# Patient Record
Sex: MALE | Race: WHITE | NOT HISPANIC OR LATINO | Employment: FULL TIME | URBAN - METROPOLITAN AREA
[De-identification: names, ages, dates, MRNs, and addresses within clinical notes are randomized per-mention and may not be internally consistent; named-entity substitution may affect disease eponyms.]

---

## 2023-10-16 ENCOUNTER — TELEPHONE (OUTPATIENT)
Dept: PRIMARY CARE | Facility: CLINIC | Age: 43
End: 2023-10-16
Payer: COMMERCIAL

## 2023-10-16 NOTE — TELEPHONE ENCOUNTER
Patient's wife called stating patient, spouse and daughter have covid.  Patient has a terrible cough. Patient treated with paxlovid.  Patient's daughter was prescribed tessalon perls by her physician which is helping her and told patient to ask if he also could have rx for tessalon perls.  [facility] [place].  Wife lAejandra phone [phone].

## 2024-01-05 ENCOUNTER — TELEPHONE (OUTPATIENT)
Dept: PRIMARY CARE | Facility: CLINIC | Age: 44
End: 2024-01-05
Payer: COMMERCIAL

## 2024-01-26 ENCOUNTER — APPOINTMENT (OUTPATIENT)
Dept: PRIMARY CARE | Facility: CLINIC | Age: 44
End: 2024-01-26
Payer: COMMERCIAL

## 2024-02-09 ENCOUNTER — APPOINTMENT (OUTPATIENT)
Dept: PRIMARY CARE | Facility: CLINIC | Age: 44
End: 2024-02-09
Payer: COMMERCIAL

## 2024-02-21 ENCOUNTER — OFFICE VISIT (OUTPATIENT)
Dept: PRIMARY CARE | Facility: CLINIC | Age: 44
End: 2024-02-21
Payer: COMMERCIAL

## 2024-02-21 ENCOUNTER — LAB (OUTPATIENT)
Dept: LAB | Facility: LAB | Age: 44
End: 2024-02-21
Payer: COMMERCIAL

## 2024-02-21 VITALS
OXYGEN SATURATION: 97 % | DIASTOLIC BLOOD PRESSURE: 113 MMHG | SYSTOLIC BLOOD PRESSURE: 160 MMHG | HEIGHT: 67 IN | BODY MASS INDEX: 43.24 KG/M2 | TEMPERATURE: 97.1 F | WEIGHT: 275.5 LBS | HEART RATE: 88 BPM

## 2024-02-21 DIAGNOSIS — R63.5 WEIGHT GAIN: ICD-10-CM

## 2024-02-21 DIAGNOSIS — Z00.00 ANNUAL PHYSICAL EXAM: ICD-10-CM

## 2024-02-21 DIAGNOSIS — G47.33 OBSTRUCTIVE SLEEP APNEA SYNDROME: ICD-10-CM

## 2024-02-21 DIAGNOSIS — E55.9 VITAMIN D DEFICIENCY: ICD-10-CM

## 2024-02-21 DIAGNOSIS — Z23 ENCOUNTER FOR IMMUNIZATION: Primary | ICD-10-CM

## 2024-02-21 DIAGNOSIS — Z86.69 HISTORY OF GLAUCOMA: ICD-10-CM

## 2024-02-21 DIAGNOSIS — K21.9 GASTROESOPHAGEAL REFLUX DISEASE, UNSPECIFIED WHETHER ESOPHAGITIS PRESENT: ICD-10-CM

## 2024-02-21 DIAGNOSIS — I10 HYPERTENSION, UNSPECIFIED TYPE: ICD-10-CM

## 2024-02-21 DIAGNOSIS — M62.838 MUSCLE SPASM: ICD-10-CM

## 2024-02-21 DIAGNOSIS — L82.1 SEBORRHEIC KERATOSES: ICD-10-CM

## 2024-02-21 DIAGNOSIS — L98.9 SKIN LESION: ICD-10-CM

## 2024-02-21 PROBLEM — R51.9 HEADACHE: Status: ACTIVE | Noted: 2024-02-21

## 2024-02-21 PROBLEM — M54.2 NECK PAIN: Status: ACTIVE | Noted: 2024-02-21

## 2024-02-21 PROBLEM — M51.9 LUMBAR DISC DISEASE: Status: ACTIVE | Noted: 2024-02-21

## 2024-02-21 PROBLEM — R91.1 LUNG NODULE: Status: RESOLVED | Noted: 2024-02-21 | Resolved: 2024-02-21

## 2024-02-21 LAB
25(OH)D3 SERPL-MCNC: 38 NG/ML (ref 30–100)
ALBUMIN SERPL BCP-MCNC: 4.7 G/DL (ref 3.4–5)
ALP SERPL-CCNC: 72 U/L (ref 33–120)
ALT SERPL W P-5'-P-CCNC: 72 U/L (ref 10–52)
ANION GAP SERPL CALC-SCNC: 12 MMOL/L (ref 10–20)
AST SERPL W P-5'-P-CCNC: 38 U/L (ref 9–39)
BILIRUB SERPL-MCNC: 0.7 MG/DL (ref 0–1.2)
BUN SERPL-MCNC: 12 MG/DL (ref 6–23)
CALCIUM SERPL-MCNC: 9.9 MG/DL (ref 8.6–10.6)
CHLORIDE SERPL-SCNC: 101 MMOL/L (ref 98–107)
CHOLEST SERPL-MCNC: 230 MG/DL (ref 0–199)
CHOLESTEROL/HDL RATIO: 5.9
CO2 SERPL-SCNC: 31 MMOL/L (ref 21–32)
CREAT SERPL-MCNC: 0.89 MG/DL (ref 0.5–1.3)
EGFRCR SERPLBLD CKD-EPI 2021: >90 ML/MIN/1.73M*2
GLUCOSE SERPL-MCNC: 90 MG/DL (ref 74–99)
HDLC SERPL-MCNC: 39.3 MG/DL
LDLC SERPL CALC-MCNC: 128 MG/DL
NON HDL CHOLESTEROL: 191 MG/DL (ref 0–149)
POTASSIUM SERPL-SCNC: 3.8 MMOL/L (ref 3.5–5.3)
PROT SERPL-MCNC: 7.7 G/DL (ref 6.4–8.2)
SODIUM SERPL-SCNC: 140 MMOL/L (ref 136–145)
TRIGL SERPL-MCNC: 316 MG/DL (ref 0–149)
TSH SERPL-ACNC: 1.23 MIU/L (ref 0.44–3.98)
VLDL: 63 MG/DL (ref 0–40)

## 2024-02-21 PROCEDURE — 3080F DIAST BP >= 90 MM HG: CPT | Performed by: NURSE PRACTITIONER

## 2024-02-21 PROCEDURE — 80061 LIPID PANEL: CPT

## 2024-02-21 PROCEDURE — 82306 VITAMIN D 25 HYDROXY: CPT

## 2024-02-21 PROCEDURE — 1036F TOBACCO NON-USER: CPT | Performed by: NURSE PRACTITIONER

## 2024-02-21 PROCEDURE — 90471 IMMUNIZATION ADMIN: CPT | Performed by: NURSE PRACTITIONER

## 2024-02-21 PROCEDURE — 80053 COMPREHEN METABOLIC PANEL: CPT

## 2024-02-21 PROCEDURE — 3077F SYST BP >= 140 MM HG: CPT | Performed by: NURSE PRACTITIONER

## 2024-02-21 PROCEDURE — 90715 TDAP VACCINE 7 YRS/> IM: CPT | Performed by: NURSE PRACTITIONER

## 2024-02-21 PROCEDURE — 36415 COLL VENOUS BLD VENIPUNCTURE: CPT

## 2024-02-21 PROCEDURE — 99396 PREV VISIT EST AGE 40-64: CPT | Performed by: NURSE PRACTITIONER

## 2024-02-21 PROCEDURE — 84443 ASSAY THYROID STIM HORMONE: CPT

## 2024-02-21 RX ORDER — CYCLOBENZAPRINE HCL 10 MG
10 TABLET ORAL 2 TIMES DAILY PRN
Qty: 30 TABLET | Refills: 2 | Status: SHIPPED | OUTPATIENT
Start: 2024-02-21 | End: 2024-04-21

## 2024-02-21 RX ORDER — OMEPRAZOLE 40 MG/1
40 CAPSULE, DELAYED RELEASE ORAL DAILY
COMMUNITY
End: 2024-02-21 | Stop reason: SDUPTHER

## 2024-02-21 RX ORDER — DORZOLAMIDE HYDROCHLORIDE AND TIMOLOL MALEATE 20; 5 MG/ML; MG/ML
SOLUTION/ DROPS OPHTHALMIC
COMMUNITY
Start: 2024-02-05

## 2024-02-21 RX ORDER — OMEPRAZOLE 40 MG/1
40 CAPSULE, DELAYED RELEASE ORAL DAILY
Qty: 90 CAPSULE | Refills: 3 | Status: SHIPPED | OUTPATIENT
Start: 2024-02-21 | End: 2024-02-21 | Stop reason: SDUPTHER

## 2024-02-21 RX ORDER — LATANOPROST 50 UG/ML
SOLUTION/ DROPS OPHTHALMIC
COMMUNITY

## 2024-02-21 RX ORDER — TELMISARTAN 80 MG/1
80 TABLET ORAL DAILY
COMMUNITY

## 2024-02-21 RX ORDER — OMEPRAZOLE 40 MG/1
40 CAPSULE, DELAYED RELEASE ORAL DAILY
Qty: 90 CAPSULE | Refills: 3 | Status: SHIPPED | OUTPATIENT
Start: 2024-02-21 | End: 2025-02-20

## 2024-02-21 ASSESSMENT — ENCOUNTER SYMPTOMS
APPETITE CHANGE: 0
NERVOUS/ANXIOUS: 0
EYE PAIN: 0
NUMBNESS: 0
DIARRHEA: 0
ARTHRALGIAS: 0
VOMITING: 0
WHEEZING: 0
PALPITATIONS: 0
FREQUENCY: 0
HEMATURIA: 0
COUGH: 0
ABDOMINAL PAIN: 0
ABDOMINAL DISTENTION: 0
SHORTNESS OF BREATH: 0
ACTIVITY CHANGE: 0
EYE ITCHING: 0
DYSURIA: 0
WOUND: 0
CONSTIPATION: 0

## 2024-02-21 ASSESSMENT — PATIENT HEALTH QUESTIONNAIRE - PHQ9
2. FEELING DOWN, DEPRESSED OR HOPELESS: NOT AT ALL
1. LITTLE INTEREST OR PLEASURE IN DOING THINGS: NOT AT ALL
SUM OF ALL RESPONSES TO PHQ9 QUESTIONS 1 AND 2: 0

## 2024-02-21 NOTE — PROGRESS NOTES
Ole Maxwell male   1980 43 y.o.   [id number]    Chief Complaint  Establish Care.    History Of Present Illness  Ole Maxwell is a 43 y.o. male presents today to establish care.     Chronic conditions reviewed:     Glaucoma   Seun in [place] every 6 mo.  Recent chelazion.  Has follow up this week.   As infant treated w O2 which caused damage to eyes   Cataracts at age 15.   Developed glaucoma around age 12-14.       GERD   On ppi x years.     HTN   Managed on telmisartan.  Compliant with medication.   States had lost a significant amount of weight.     Lung nodule   Had bx in past. No malignancy noted per patient and does not require follow up     Sciatic pain   States has known disc protrusion.    Manages pain w marijuana (edible or vape)    In past also managed opioids prn.       Sleep apnea   Had sleep study done. Sleep study completed 12/12/2022-severe obstructive sleep apnea noted SpO2 zander was 68%Would like to start CPAP.      States has an area on back that he would like to be evaluated by dermatologist.     Headaches/neck pain   Follows w chiro every 2-3 weeks.     Palpitations   On occasions -- noted since COVID in octobers.      Review of Systems  Review of Systems   Constitutional:  Negative for activity change and appetite change.   HENT:  Negative for congestion.    Eyes:  Negative for pain and itching.   Respiratory:  Negative for cough, shortness of breath and wheezing.    Cardiovascular:  Negative for chest pain, palpitations and leg swelling.   Gastrointestinal:  Negative for abdominal distention, abdominal pain, constipation, diarrhea (loose stools at times) and vomiting.   Genitourinary:  Negative for dysuria, frequency, hematuria and urgency.   Musculoskeletal:  Negative for arthralgias and gait problem.   Skin:  Negative for rash and wound.   Neurological:  Negative for numbness.   Psychiatric/Behavioral:  The patient is not nervous/anxious.        Diet: trying to stay healthy  "and loose weight.   Exercise: yes, daily.   Dental: Twice per year  Ophtho: Dr Kohli   States had colonoscopy in past which was benign- no follow up was needed.   Done due to GI complaints at that time   Immunizations:   Flu- declines  Tdap- would like today   Covid- had some vaccines, not latest booster       Past Medical History  He has a past medical history of Lung nodule (02/21/2024) and Personal history of other mental and behavioral disorders.    Surgical History  He has a past surgical history that includes Other surgical history (12/11/2018); Other surgical history (12/11/2018); Other surgical history (12/11/2018); and CT guided percutaneous biopsy lung (10/18/2018).    Family History  Family History   Problem Relation Name Age of Onset    Diabetes Father      Thyroid disease Father      Heart disease Father      Breast cancer Father          Social History  He reports that he quit smoking about 24 years ago. His smoking use included cigarettes. He has never used smokeless tobacco. He reports that he does not currently use alcohol. He reports current drug use. Drug: Marijuana.    DEPRESSION SCREEN  Over the past 2 weeks, how often have you been bothered by any of the following problems?  Little interest or pleasure in doing things: Not at all  Feeling down, depressed, or hopeless: Not at all    Allergies  Patient has no known allergies.    Medications  Current Outpatient Medications   Medication Instructions    cyclobenzaprine (FLEXERIL) 10 mg, oral, 2 times daily PRN    dorzolamide-timoloL (Cosopt) 22.3-6.8 mg/mL ophthalmic solution INSTILL 1 DROP INTO LEFT EYE TWICE A DAY    hydroCHLOROthiazide (HYDRODIURIL) 25 mg, oral, Daily    latanoprost (Xalatan) 0.005 % ophthalmic solution INSTILL 1 DROP INTO BOTH EYES    omeprazole (PRILOSEC) 40 mg, oral, Daily    telmisartan (MICARDIS) 80 mg, oral, Daily        Objective   BP (!) 160/113   Pulse 88   Temp 36.2 °C (97.1 °F)   Ht 1.702 m (5' 7\")   Wt 125 kg " "(275 lb 8 oz)   SpO2 97%   BMI 43.15 kg/m²    BMI: Estimated body mass index is 43.15 kg/m² as calculated from the following:    Height as of this encounter: 1.702 m (5' 7\").    Weight as of this encounter: 125 kg (275 lb 8 oz).    Physical Exam  Physical Exam  Vitals and nursing note reviewed.   Constitutional:       Appearance: Normal appearance. He is obese.   HENT:      Head: Normocephalic and atraumatic.      Nose: Nose normal.      Mouth/Throat:      Mouth: Mucous membranes are moist.      Pharynx: Oropharynx is clear.   Eyes:      Extraocular Movements: Extraocular movements intact.      Conjunctiva/sclera: Conjunctivae normal.      Pupils: Pupils are equal, round, and reactive to light.      Comments: Exophthalmos   Cardiovascular:      Rate and Rhythm: Normal rate and regular rhythm.      Pulses: Normal pulses.      Heart sounds: Normal heart sounds.   Pulmonary:      Effort: Pulmonary effort is normal.      Breath sounds: Normal breath sounds.   Abdominal:      General: Bowel sounds are normal.      Palpations: Abdomen is soft.      Tenderness: There is no abdominal tenderness.   Musculoskeletal:         General: Normal range of motion.      Cervical back: Neck supple.   Skin:     General: Skin is warm and dry.      Comments: Scattered cherry angiomas and small scattered Seborrheic keratoses on back      Neurological:      General: No focal deficit present.      Mental Status: He is alert and oriented to person, place, and time. Mental status is at baseline.   Psychiatric:         Mood and Affect: Mood normal.         Behavior: Behavior normal.         Thought Content: Thought content normal.         Judgment: Judgment normal.               Assessment and Plan  Assessment/Plan   Problem List Items Addressed This Visit             ICD-10-CM    GERD (gastroesophageal reflux disease) K21.9     -Chronic, stable  -Managed on omeprazole         Relevant Medications    omeprazole (PriLOSEC) 40 mg DR capsule    " HTN (hypertension) I10     Managed on Telmisartan  -BP elevated today-please monitor BP over the next 2 weeks at home, follow-up in office for reevaluation and possible medication adjustment         Relevant Orders    Follow Up In Primary Care - Established    Vitamin D deficiency E55.9     -Chronic, stable  -Continues on vitamin D supplement  -Vitamin D level today         Relevant Orders    Vitamin D 25-Hydroxy,Total (for eval of Vitamin D levels) (Completed)    Sleep apnea G47.30     -Completed sleep study but never followed up with obtaining CPAP  -Sleep study completed 12/12/2022-severe obstructive sleep apnea noted SpO2 zander was 68%  -Would like to start now-CPAP prescription will be sent to [facility]         History of glaucoma Z86.69     Continue to follow with Dr. Kohli (ophthalmology)          Other Visit Diagnoses         Codes    Encounter for immunization    -  Primary Z23    Relevant Orders    Tdap vaccine, age 7 years and older (ADACEL) (Completed)    Skin lesion     L98.9    Relevant Orders    Referral to Dermatology    Annual physical exam     Z00.00    Relevant Orders    Lipid Panel (Completed)    Comprehensive Metabolic Panel (Completed)    Weight gain     R63.5    Relevant Orders    Tsh With Reflex To Free T4 If Abnormal (Completed)    Muscle spasm     M62.838    Relevant Medications    cyclobenzaprine (Flexeril) 10 mg tablet    Seborrheic keratoses     L82.1    Relevant Orders    Referral to Dermatology

## 2024-02-21 NOTE — PATIENT INSTRUCTIONS
Please call or send me a message in 7 days about your home blood pressure readings.   Please follow up in office in 2 weeks.

## 2024-02-22 DIAGNOSIS — I10 HYPERTENSION, UNSPECIFIED TYPE: Primary | ICD-10-CM

## 2024-02-22 DIAGNOSIS — G47.33 OBSTRUCTIVE SLEEP APNEA SYNDROME: ICD-10-CM

## 2024-02-22 PROBLEM — E78.5 HLD (HYPERLIPIDEMIA): Status: ACTIVE | Noted: 2024-02-22

## 2024-02-22 RX ORDER — HYDROCHLOROTHIAZIDE 25 MG/1
25 TABLET ORAL DAILY
Qty: 30 TABLET | Refills: 1 | Status: SHIPPED | OUTPATIENT
Start: 2024-02-22 | End: 2024-03-17

## 2024-02-25 PROBLEM — Z86.69 HISTORY OF GLAUCOMA: Status: ACTIVE | Noted: 2024-02-25

## 2024-02-25 PROBLEM — E55.9 VITAMIN D DEFICIENCY: Status: ACTIVE | Noted: 2024-02-25

## 2024-02-25 PROBLEM — G47.30 SLEEP APNEA: Status: ACTIVE | Noted: 2024-02-25

## 2024-02-26 NOTE — ASSESSMENT & PLAN NOTE
Managed on Telmisartan  -BP elevated today-please monitor BP over the next 2 weeks at home, follow-up in office for reevaluation and possible medication adjustment

## 2024-02-26 NOTE — ASSESSMENT & PLAN NOTE
-Completed sleep study but never followed up with obtaining CPAP  -Sleep study completed 12/12/2022-severe obstructive sleep apnea noted SpO2 zander was 68%  -Would like to start now-CPAP prescription will be sent to medical supply company

## 2024-03-08 ENCOUNTER — LAB (OUTPATIENT)
Dept: LAB | Facility: LAB | Age: 44
End: 2024-03-08
Payer: COMMERCIAL

## 2024-03-08 ENCOUNTER — OFFICE VISIT (OUTPATIENT)
Dept: PRIMARY CARE | Facility: CLINIC | Age: 44
End: 2024-03-08
Payer: COMMERCIAL

## 2024-03-08 VITALS
SYSTOLIC BLOOD PRESSURE: 158 MMHG | HEART RATE: 101 BPM | DIASTOLIC BLOOD PRESSURE: 86 MMHG | TEMPERATURE: 96.8 F | BODY MASS INDEX: 42.5 KG/M2 | HEIGHT: 67 IN | WEIGHT: 270.8 LBS | OXYGEN SATURATION: 98 %

## 2024-03-08 DIAGNOSIS — I10 HYPERTENSION, UNSPECIFIED TYPE: ICD-10-CM

## 2024-03-08 DIAGNOSIS — E66.1 CLASS 3 DRUG-INDUCED OBESITY WITH SERIOUS COMORBIDITY AND BODY MASS INDEX (BMI) OF 40.0 TO 44.9 IN ADULT (MULTI): ICD-10-CM

## 2024-03-08 DIAGNOSIS — Z79.899 MEDICATION MANAGEMENT: ICD-10-CM

## 2024-03-08 DIAGNOSIS — Z79.899 MEDICATION MANAGEMENT: Primary | ICD-10-CM

## 2024-03-08 PROBLEM — E66.813 CLASS 3 DRUG-INDUCED OBESITY WITH SERIOUS COMORBIDITY AND BODY MASS INDEX (BMI) OF 40.0 TO 44.9 IN ADULT: Status: ACTIVE | Noted: 2024-03-08

## 2024-03-08 LAB
ANION GAP SERPL CALC-SCNC: 12 MMOL/L (ref 10–20)
BUN SERPL-MCNC: 12 MG/DL (ref 6–23)
CALCIUM SERPL-MCNC: 9.9 MG/DL (ref 8.6–10.6)
CHLORIDE SERPL-SCNC: 100 MMOL/L (ref 98–107)
CO2 SERPL-SCNC: 31 MMOL/L (ref 21–32)
CREAT SERPL-MCNC: 0.92 MG/DL (ref 0.5–1.3)
EGFRCR SERPLBLD CKD-EPI 2021: >90 ML/MIN/1.73M*2
GLUCOSE SERPL-MCNC: 139 MG/DL (ref 74–99)
POTASSIUM SERPL-SCNC: 3.5 MMOL/L (ref 3.5–5.3)
SODIUM SERPL-SCNC: 139 MMOL/L (ref 136–145)

## 2024-03-08 PROCEDURE — 3079F DIAST BP 80-89 MM HG: CPT | Performed by: NURSE PRACTITIONER

## 2024-03-08 PROCEDURE — 36415 COLL VENOUS BLD VENIPUNCTURE: CPT

## 2024-03-08 PROCEDURE — 1036F TOBACCO NON-USER: CPT | Performed by: NURSE PRACTITIONER

## 2024-03-08 PROCEDURE — 99214 OFFICE O/P EST MOD 30 MIN: CPT | Performed by: NURSE PRACTITIONER

## 2024-03-08 PROCEDURE — 3008F BODY MASS INDEX DOCD: CPT | Performed by: NURSE PRACTITIONER

## 2024-03-08 PROCEDURE — 80048 BASIC METABOLIC PNL TOTAL CA: CPT

## 2024-03-08 PROCEDURE — 3077F SYST BP >= 140 MM HG: CPT | Performed by: NURSE PRACTITIONER

## 2024-03-08 ASSESSMENT — PATIENT HEALTH QUESTIONNAIRE - PHQ9
2. FEELING DOWN, DEPRESSED OR HOPELESS: NOT AT ALL
SUM OF ALL RESPONSES TO PHQ9 QUESTIONS 1 & 2: 0
1. LITTLE INTEREST OR PLEASURE IN DOING THINGS: NOT AT ALL

## 2024-03-08 ASSESSMENT — ENCOUNTER SYMPTOMS
CONSTIPATION: 0
EYE PAIN: 0
VOMITING: 0
ABDOMINAL DISTENTION: 0
WHEEZING: 0
DIARRHEA: 0
ACTIVITY CHANGE: 0
HEMATURIA: 0
ABDOMINAL PAIN: 0
ARTHRALGIAS: 0
NERVOUS/ANXIOUS: 0
FREQUENCY: 0
APPETITE CHANGE: 0
PALPITATIONS: 0
NUMBNESS: 0
DYSURIA: 0
EYE ITCHING: 0
SHORTNESS OF BREATH: 0
COUGH: 0
WOUND: 0

## 2024-03-08 NOTE — PROGRESS NOTES
Chief Complaint  Hypertension.    History Of Present Illness  Ole Maxwell is a 43 y.o. male presents today for follow up of Hypertension.   In past managed on telmisartan 80 mg daily.  Hydrochlorothiazide 25 mg daily added 2 weeks ago.  Reports that he was just at the gym and attributes elevated blood pressure to this today.  Has been monitoring blood pressures at home with significant improvement since starting hydrochlorothiazide-systolics improved from 185- 140s,   diastolics improved from 110 to 78-88.  Patient reports that he feels better  since starting hydrochlorothiazide and has decreased occurrence of headaches.   States did not  sleep well last night.    States works out twice per day m-f, drinks 3-4 liters per day   Has lost weight over last year.   Is strict w diet- no pop, limits carbs.      Wt Readings from Last 30 Encounters:   03/08/24 123 kg (270 lb 12.8 oz)   02/21/24 125 kg (275 lb 8 oz)   06/14/23 112 kg (246 lb)   08/25/22 131 kg (289 lb)   10/01/21 127 kg (279 lb)   09/29/20 115 kg (253 lb 9 oz)   10/18/18 113 kg (249 lb 12.5 oz)           Review of Systems  Review of Systems   Constitutional:  Negative for activity change and appetite change.   HENT:  Negative for congestion.    Eyes:  Negative for pain and itching.   Respiratory:  Negative for cough, shortness of breath and wheezing.    Cardiovascular:  Negative for chest pain, palpitations and leg swelling.   Gastrointestinal:  Negative for abdominal distention, abdominal pain, constipation, diarrhea (loose stools at times) and vomiting.   Genitourinary:  Negative for dysuria, frequency, hematuria and urgency.   Musculoskeletal:  Negative for arthralgias and gait problem.   Skin:  Negative for rash and wound.   Neurological:  Negative for numbness.   Psychiatric/Behavioral:  The patient is not nervous/anxious.        Past Medical History  He has a past medical history of Lung nodule (02/21/2024) and Personal history of other mental and  "behavioral disorders.    Surgical History  He has a past surgical history that includes Other surgical history (12/11/2018); Other surgical history (12/11/2018); Other surgical history (12/11/2018); and CT guided percutaneous biopsy lung (10/18/2018).    Family History  Family History   Problem Relation Name Age of Onset    Diabetes Father      Thyroid disease Father      Heart disease Father      Breast cancer Father          Social History  He reports that he quit smoking about 24 years ago. His smoking use included cigarettes. He has never used smokeless tobacco. He reports that he does not currently use alcohol. He reports current drug use. Drug: Marijuana.    DEPRESSION SCREEN  Over the past 2 weeks, how often have you been bothered by any of the following problems?  Little interest or pleasure in doing things: Not at all  Feeling down, depressed, or hopeless: Not at all      Allergies  Patient has no known allergies.    Medications  Current Outpatient Medications   Medication Instructions    cyclobenzaprine (FLEXERIL) 10 mg, oral, 2 times daily PRN    dorzolamide-timoloL (Cosopt) 22.3-6.8 mg/mL ophthalmic solution INSTILL 1 DROP INTO LEFT EYE TWICE A DAY    hydroCHLOROthiazide (HYDRODIURIL) 25 mg, oral, Daily    latanoprost (Xalatan) 0.005 % ophthalmic solution INSTILL 1 DROP INTO BOTH EYES    omeprazole (PRILOSEC) 40 mg, oral, Daily    telmisartan (MICARDIS) 80 mg, oral, Daily        Objective   /86   Pulse 101   Temp 36 °C (96.8 °F) (Temporal)   Ht 1.702 m (5' 7\")   Wt 123 kg (270 lb 12.8 oz)   SpO2 98%   BMI 42.41 kg/m²    BMI: Estimated body mass index is 42.41 kg/m² as calculated from the following:    Height as of this encounter: 1.702 m (5' 7\").    Weight as of this encounter: 123 kg (270 lb 12.8 oz).    Physical Exam  Physical Exam  Vitals and nursing note reviewed.   Constitutional:       Appearance: Normal appearance. He is obese.   HENT:      Head: Normocephalic and atraumatic.      " Nose: Nose normal.      Mouth/Throat:      Mouth: Mucous membranes are moist.      Pharynx: Oropharynx is clear.   Eyes:      Extraocular Movements: Extraocular movements intact.      Conjunctiva/sclera: Conjunctivae normal.      Pupils: Pupils are equal, round, and reactive to light.      Comments: Exophthalmos   Cardiovascular:      Rate and Rhythm: Normal rate and regular rhythm.      Pulses: Normal pulses.      Heart sounds: Normal heart sounds.   Pulmonary:      Effort: Pulmonary effort is normal.      Breath sounds: Normal breath sounds.   Abdominal:      General: Bowel sounds are normal.      Palpations: Abdomen is soft.      Tenderness: There is no abdominal tenderness.   Musculoskeletal:         General: Normal range of motion.      Cervical back: Neck supple.   Skin:     General: Skin is warm and dry.      Comments: Scattered cherry angiomas and small scattered Seborrheic keratoses on back      Neurological:      General: No focal deficit present.      Mental Status: He is alert and oriented to person, place, and time. Mental status is at baseline.   Psychiatric:         Mood and Affect: Mood normal.         Behavior: Behavior normal.         Thought Content: Thought content normal.         Judgment: Judgment normal.         Relevant Results and Imaging    No images are attached to the encounter.        Assessment and Plan  Assessment/Plan

## 2024-03-08 NOTE — ASSESSMENT & PLAN NOTE
Chronic,-Unstable  Managed on: Telmisartan, started hydrochlorothiazide 25 mg daily on 2/22/24    -BP improving since addition of hydrochlorothiazide  -BMP today  -Follow-up in 1 month-if blood pressure still elevated will consider increasing hydrochlorothiazide to 50 mg

## 2024-03-16 DIAGNOSIS — I10 HYPERTENSION, UNSPECIFIED TYPE: ICD-10-CM

## 2024-03-17 RX ORDER — HYDROCHLOROTHIAZIDE 25 MG/1
25 TABLET ORAL DAILY
Qty: 90 TABLET | Refills: 1 | Status: SHIPPED | OUTPATIENT
Start: 2024-03-17

## 2024-03-25 ENCOUNTER — APPOINTMENT (OUTPATIENT)
Dept: DERMATOLOGY | Facility: CLINIC | Age: 44
End: 2024-03-25
Payer: COMMERCIAL

## 2024-03-26 ENCOUNTER — OFFICE VISIT (OUTPATIENT)
Dept: DERMATOLOGY | Facility: CLINIC | Age: 44
End: 2024-03-26
Payer: COMMERCIAL

## 2024-03-26 DIAGNOSIS — L91.8 SKIN TAG: ICD-10-CM

## 2024-03-26 DIAGNOSIS — L81.4 LENTIGO: ICD-10-CM

## 2024-03-26 DIAGNOSIS — L30.9 HAND DERMATITIS: ICD-10-CM

## 2024-03-26 DIAGNOSIS — L82.1 SEBORRHEIC KERATOSIS: ICD-10-CM

## 2024-03-26 DIAGNOSIS — Z12.83 SKIN CANCER SCREENING: Primary | ICD-10-CM

## 2024-03-26 DIAGNOSIS — D22.9 NEVUS: ICD-10-CM

## 2024-03-26 PROCEDURE — 1036F TOBACCO NON-USER: CPT | Performed by: NURSE PRACTITIONER

## 2024-03-26 PROCEDURE — 99203 OFFICE O/P NEW LOW 30 MIN: CPT | Performed by: NURSE PRACTITIONER

## 2024-03-26 PROCEDURE — 3008F BODY MASS INDEX DOCD: CPT | Performed by: NURSE PRACTITIONER

## 2024-03-26 RX ORDER — TRIAMCINOLONE ACETONIDE 1 MG/G
CREAM TOPICAL 2 TIMES DAILY PRN
Qty: 80 G | Refills: 1 | Status: SHIPPED | OUTPATIENT
Start: 2024-03-26

## 2024-03-26 NOTE — PROGRESS NOTES
Subjective     Ole Maxwell is a 43 y.o. male who presents for the following: Skin Check.     New patient full body skin exam.     Review of Systems:  No other skin or systemic complaints other than what is documented elsewhere in the note.    The following portions of the chart were reviewed this encounter and updated as appropriate:       Skin Cancer History  No skin cancer on file.    Specialty Problems    None    Past Medical History:  Ole Maxwell  has a past medical history of Lung nodule (02/21/2024) and Personal history of other mental and behavioral disorders.    Past Surgical History:  Ole Maxwell  has a past surgical history that includes Other surgical history (12/11/2018); Other surgical history (12/11/2018); Other surgical history (12/11/2018); and CT guided percutaneous biopsy lung (10/18/2018).    Family History:  Patient family history includes Breast cancer in his father; Diabetes in his father; Heart disease in his father; Thyroid disease in his father.    Social History:  Ole Maxwell  reports that he quit smoking about 24 years ago. His smoking use included cigarettes. He has never used smokeless tobacco. He reports that he does not currently use alcohol. He reports current drug use. Drug: Marijuana.    Allergies:  Patient has no known allergies.    Current Medications / CAM's:    Current Outpatient Medications:     cyclobenzaprine (Flexeril) 10 mg tablet, Take 1 tablet (10 mg) by mouth 2 times a day as needed for muscle spasms., Disp: 30 tablet, Rfl: 2    dorzolamide-timoloL (Cosopt) 22.3-6.8 mg/mL ophthalmic solution, INSTILL 1 DROP INTO LEFT EYE TWICE A DAY, Disp: , Rfl:     hydroCHLOROthiazide (HYDRODiuril) 25 mg tablet, TAKE 1 TABLET BY MOUTH EVERY DAY, Disp: 90 tablet, Rfl: 1    latanoprost (Xalatan) 0.005 % ophthalmic solution, INSTILL 1 DROP INTO BOTH EYES, Disp: , Rfl:     omeprazole (PriLOSEC) 40 mg DR capsule, Take 1 capsule (40 mg) by mouth once daily., Disp: 90 capsule,  Rfl: 3    telmisartan (MIcarDIS) 80 mg tablet, Take 1 tablet (80 mg) by mouth once daily., Disp: , Rfl:     triamcinolone (Kenalog) 0.1 % cream, Apply topically 2 times a day as needed for rash., Disp: 80 g, Rfl: 1     Objective   Well appearing patient in no apparent distress; mood and affect are within normal limits.    A full examination was performed including scalp, head, eyes, ears, nose, lips, neck, chest, axillae, abdomen, back, buttocks, bilateral upper extremities, bilateral lower extremities, hands, feet, fingers, toes, fingernails, and toenails. All findings within normal limits unless otherwise noted below.    Assessment/Plan   1. Skin cancer screening    The patient presented for a routine skin examination today. There are no specific concerns regarding skin health and no new or changing moles, lesions, or rashes.     Assessment: Based on the comprehensive skin examination, there were no concerning or abnormal findings. The patient's skin appeared to be in good health, without any notable dermatologic conditions or lesions.    Plan: Given the absence of any significant skin findings, no specific interventions or treatments are warranted at this time. The patient was educated on the importance of regular skin self-examinations and advised to promptly report any changes or concerns. Routine follow-up for a skin examination was recommended.    -These lesions have benign, reassuring patterns on dermoscopy.  -There were no concerning features found on exam today.  -Recommend continued self-observation, and to contact the office if any changes in nevi are  noticed.    Discussed/information given on safe sun practices and use of sunscreen, sun protective clothing or sun avoidance. Recommend to use OTC medication of sunscreen SPF 30 or higher on a daily basis prior to sun exposure to reduce the risk of skin cancer.    Contact Office if: Any lesions change in size, shape or color; itch, bum or bleed.    "      Related Procedures  Follow Up In Dermatology - Established Patient    2. Nevus  Multiple benign appearing flesh colored to pigmented macules and papules     Plan: Counseling.  I counseled the patient regarding the following:  Instructions: Monthly self-skin checks to monitor for any changes in moles are recommended. Expectations: Benign Nevi are pigmented nests of cells within the skin.No treatment is necessary. Contact Office if: Any moles change in size, shape or color; itch, bum or bleed.    3. Lentigo  Scattered tan macules in sun-exposed areas.    Solar lentigo (a type of lentigo also known as a senile lentigo, age spot, or liver spot) is a benign pigmented macule appearing on fair-skinned individuals that is related to ultraviolet radiation (UVR) exposure, typically from the sun.     PLAN:  Limiting sun exposure through avoidance, protective clothing, and use of sunscreens can help prevent the appearance of solar lentigines.    If lesion changes or becomes symptomatic she should return to clinic    4. Seborrheic keratosis    Seborrheic keratoses (SKs) are extremely common benign neoplasms of the skin. There can be few or hundreds of these raised, \"stuck-on\"-appearing papules and plaques with well-defined borders. The cause is unknown, although there is a familial trait for the development of multiple SKs.      SKs tend to increase in incidence and number with increasing age.     Skin Care: Seborrheic Keratoses are benign. No treatment is necessary.    Patient was instructed to call the office if any lesions become irritated or inflamed        5. Skin tag (2)  Left Medial Thigh, Neck - Anterior  Fleshy, skin-colored sessile and pedunculated papules.     Destr of lesion - Left Medial Thigh, Neck - Anterior    6. Hand dermatitis  Left Dorsal Hand; Right Dorsal Hand    PLAN:  Can use triamcinolone 0.1% cream twice daily     Related Medications  triamcinolone (Kenalog) 0.1 % cream  Apply topically 2 times a " day as needed for rash.

## 2024-04-10 ENCOUNTER — APPOINTMENT (OUTPATIENT)
Dept: PRIMARY CARE | Facility: CLINIC | Age: 44
End: 2024-04-10
Payer: COMMERCIAL

## 2024-05-09 ENCOUNTER — TELEPHONE (OUTPATIENT)
Dept: PRIMARY CARE | Facility: CLINIC | Age: 44
End: 2024-05-09
Payer: COMMERCIAL

## 2024-05-09 NOTE — TELEPHONE ENCOUNTER
Kaur patient     Left message, has poison ivy from head to toe would like topical steroid cream  Called in does not like to take prednisone, make feel bad     [facility]

## 2024-05-13 ENCOUNTER — OFFICE VISIT (OUTPATIENT)
Dept: PRIMARY CARE | Facility: CLINIC | Age: 44
End: 2024-05-13
Payer: COMMERCIAL

## 2024-05-13 VITALS
TEMPERATURE: 97.4 F | DIASTOLIC BLOOD PRESSURE: 99 MMHG | HEART RATE: 106 BPM | BODY MASS INDEX: 42.56 KG/M2 | OXYGEN SATURATION: 97 % | SYSTOLIC BLOOD PRESSURE: 172 MMHG | WEIGHT: 271.2 LBS | HEIGHT: 67 IN

## 2024-05-13 DIAGNOSIS — I10 PRIMARY HYPERTENSION: Primary | ICD-10-CM

## 2024-05-13 PROCEDURE — 3008F BODY MASS INDEX DOCD: CPT | Performed by: NURSE PRACTITIONER

## 2024-05-13 PROCEDURE — 3077F SYST BP >= 140 MM HG: CPT | Performed by: NURSE PRACTITIONER

## 2024-05-13 PROCEDURE — 3080F DIAST BP >= 90 MM HG: CPT | Performed by: NURSE PRACTITIONER

## 2024-05-13 PROCEDURE — 99213 OFFICE O/P EST LOW 20 MIN: CPT | Performed by: NURSE PRACTITIONER

## 2024-05-13 RX ORDER — AMLODIPINE BESYLATE 5 MG/1
5 TABLET ORAL DAILY
Qty: 90 TABLET | Refills: 0 | Status: SHIPPED | OUTPATIENT
Start: 2024-05-13 | End: 2024-08-11

## 2024-05-13 ASSESSMENT — ENCOUNTER SYMPTOMS
ABDOMINAL DISTENTION: 0
WOUND: 0
PALPITATIONS: 0
NERVOUS/ANXIOUS: 0
FREQUENCY: 0
WHEEZING: 0
EYE PAIN: 0
SHORTNESS OF BREATH: 0
HEMATURIA: 0
APPETITE CHANGE: 0
ABDOMINAL PAIN: 0
DYSURIA: 0
VOMITING: 0
EYE ITCHING: 0
ARTHRALGIAS: 0
NUMBNESS: 0
DIARRHEA: 0
COUGH: 0
ACTIVITY CHANGE: 0
CONSTIPATION: 0

## 2024-05-13 NOTE — PROGRESS NOTES
Chief Complaint  Follow-up (Hypertension).    History Of Present Illness  Ole Maxwell is a 44 y.o. male presents today for follow up of Follow-up (Hypertension).    Poison Ivy all over.  Was trimming a bush, got into poison ivy. Has  been using tecnu scrub.  Declines oral steroid -- states gets too jittery. States his rash is improving.   Blood pressure again elevated.  At home Bps 130s-150s.  Remains compliant w hydrochlorothiazide and telmisartan.     Review of Systems  Review of Systems   Constitutional:  Negative for activity change and appetite change.   HENT:  Negative for congestion.    Eyes:  Negative for pain and itching.   Respiratory:  Negative for cough, shortness of breath and wheezing.    Cardiovascular:  Negative for chest pain, palpitations and leg swelling.   Gastrointestinal:  Negative for abdominal distention, abdominal pain, constipation, diarrhea (loose stools at times) and vomiting.   Genitourinary:  Negative for dysuria, frequency, hematuria and urgency.   Musculoskeletal:  Negative for arthralgias and gait problem.   Skin:  Negative for rash and wound.   Neurological:  Negative for numbness.   Psychiatric/Behavioral:  The patient is not nervous/anxious.        Past Medical History  He has a past medical history of Lung nodule (02/21/2024) and Personal history of other mental and behavioral disorders.    Surgical History  He has a past surgical history that includes Other surgical history (12/11/2018); Other surgical history (12/11/2018); Other surgical history (12/11/2018); and CT guided percutaneous biopsy lung (10/18/2018).    Family History  Family History   Problem Relation Name Age of Onset    Diabetes Father      Thyroid disease Father      Heart disease Father      Breast cancer Father          Social History  He reports that he quit smoking about 24 years ago. His smoking use included cigarettes. He has never used smokeless tobacco. He reports that he does not currently use  "alcohol. He reports current drug use. Drug: Marijuana.    DEPRESSION SCREEN  Over the past 2 weeks, how often have you been bothered by any of the following problems?  Little interest or pleasure in doing things: Not at all  Feeling down, depressed, or hopeless: Not at all      Allergies  Patient has no known allergies.    Medications  Current Outpatient Medications   Medication Instructions    amLODIPine (NORVASC) 5 mg, oral, Daily    cyclobenzaprine (FLEXERIL) 10 mg, oral, 2 times daily PRN    dorzolamide-timoloL (Cosopt) 22.3-6.8 mg/mL ophthalmic solution INSTILL 1 DROP INTO LEFT EYE TWICE A DAY    hydroCHLOROthiazide (HYDRODIURIL) 25 mg, oral, Daily    latanoprost (Xalatan) 0.005 % ophthalmic solution INSTILL 1 DROP INTO BOTH EYES    omeprazole (PRILOSEC) 40 mg, oral, Daily    telmisartan (MICARDIS) 80 mg, oral, Daily    triamcinolone (Kenalog) 0.1 % cream Topical, 2 times daily PRN        Objective   BP (!) 172/99 (BP Location: Left arm, Patient Position: Sitting, BP Cuff Size: Adult)   Pulse 106   Temp 36.3 °C (97.4 °F) (Temporal)   Ht 1.702 m (5' 7\")   Wt 123 kg (271 lb 3.2 oz)   SpO2 97%   BMI 42.48 kg/m²    BMI: Estimated body mass index is 42.48 kg/m² as calculated from the following:    Height as of this encounter: 1.702 m (5' 7\").    Weight as of this encounter: 123 kg (271 lb 3.2 oz).    Wt Readings from Last 6 Encounters:   05/13/24 123 kg (271 lb 3.2 oz)   03/08/24 123 kg (270 lb 12.8 oz)   02/21/24 125 kg (275 lb 8 oz)   06/14/23 112 kg (246 lb)   08/25/22 131 kg (289 lb)   10/01/21 127 kg (279 lb)     BP Readings from Last 6 Encounters:   05/13/24 (!) 172/99   03/08/24 158/86   02/21/24 (!) 160/113   06/14/23 130/82   08/25/22 120/82   10/01/21 150/90         Physical Exam  Physical Exam  Vitals and nursing note reviewed.   Constitutional:       Appearance: Normal appearance. He is obese.   HENT:      Head: Normocephalic and atraumatic.      Nose: Nose normal.      Mouth/Throat:      Mouth: " Mucous membranes are moist.      Pharynx: Oropharynx is clear.   Eyes:      Extraocular Movements: Extraocular movements intact.      Conjunctiva/sclera: Conjunctivae normal.      Pupils: Pupils are equal, round, and reactive to light.      Comments: Exophthalmos   Cardiovascular:      Rate and Rhythm: Normal rate and regular rhythm.      Pulses: Normal pulses.      Heart sounds: Normal heart sounds.   Pulmonary:      Effort: Pulmonary effort is normal.      Breath sounds: Normal breath sounds.   Abdominal:      General: Bowel sounds are normal.      Palpations: Abdomen is soft.      Tenderness: There is no abdominal tenderness.   Musculoskeletal:         General: Normal range of motion.      Cervical back: Neck supple.   Skin:     General: Skin is warm and dry.      Findings: Rash (poison ivy rash- generalized- all skin folds. on all extremities and torso) present.      Comments: Scattered cherry angiomas and small scattered Seborrheic keratoses on back      Neurological:      General: No focal deficit present.      Mental Status: He is alert and oriented to person, place, and time. Mental status is at baseline.   Psychiatric:         Mood and Affect: Mood normal.         Behavior: Behavior normal.         Thought Content: Thought content normal.         Judgment: Judgment normal.         Relevant Results and Imaging  Lab on 03/08/2024   Component Date Value Ref Range Status    Glucose 03/08/2024 139 (H)  74 - 99 mg/dL Final    Sodium 03/08/2024 139  136 - 145 mmol/L Final    Potassium 03/08/2024 3.5  3.5 - 5.3 mmol/L Final    Chloride 03/08/2024 100  98 - 107 mmol/L Final    Bicarbonate 03/08/2024 31  21 - 32 mmol/L Final    Anion Gap 03/08/2024 12  10 - 20 mmol/L Final    Urea Nitrogen 03/08/2024 12  6 - 23 mg/dL Final    Creatinine 03/08/2024 0.92  0.50 - 1.30 mg/dL Final    eGFR 03/08/2024 >90  >60 mL/min/1.73m*2 Final    Calculations of estimated GFR are performed using the 2021 CKD-EPI Study Refit equation  without the race variable for the IDMS-Traceable creatinine methods.  https://jasn.asnjournals.org/content/early/2021/09/22/ASN.8167036904    Calcium 03/08/2024 9.9  8.6 - 10.6 mg/dL Final   Lab on 02/21/2024   Component Date Value Ref Range Status    Cholesterol 02/21/2024 230 (H)  0 - 199 mg/dL Final          Age      Desirable   Borderline High   High     0-19 Y     0 - 169       170 - 199     >/= 200    20-24 Y     0 - 189       190 - 224     >/= 225         >24 Y     0 - 199       200 - 239     >/= 240   **All ranges are based on fasting samples. Specific   therapeutic targets will vary based on patient-specific   cardiac risk.    Pediatric guidelines reference:Pediatrics 2011, 128(S5).Adult guidelines reference: NCEP ATPIII Guidelines,DANIELLE 2001, 258:0116-66    Venipuncture immediately after or during the administration of Metamizole may lead to falsely low results. Testing should be performed immediately prior to Metamizole dosing.    HDL-Cholesterol 02/21/2024 39.3  mg/dL Final      Age       Very Low   Low     Normal    High    0-19 Y    < 35      < 40     40-45     ----  20-24 Y    ----     < 40      >45      ----        >24 Y      ----     < 40     40-60      >60      Cholesterol/HDL Ratio 02/21/2024 5.9   Final      Ref Values  Desirable  < 3.4  High Risk  > 5.0    LDL Calculated 02/21/2024 128 (H)  <=99 mg/dL Final                                Near   Borderline      AGE      Desirable  Optimal    High     High     Very High     0-19 Y     0 - 109     ---    110-129   >/= 130     ----    20-24 Y     0 - 119     ---    120-159   >/= 160     ----      >24 Y     0 -  99   100-129  130-159   160-189     >/=190      VLDL 02/21/2024 63 (H)  0 - 40 mg/dL Final    Triglycerides 02/21/2024 316 (H)  0 - 149 mg/dL Final       Age         Desirable   Borderline High   High     Very High   0 D-90 D    19 - 174         ----         ----        ----  91 D- 9 Y     0 -  74        75 -  99     >/= 100      ----    10-19  Y     0 -  89        90 - 129     >/= 130      ----    20-24 Y     0 - 114       115 - 149     >/= 150      ----         >24 Y     0 - 149       150 - 199    200- 499    >/= 500    Venipuncture immediately after or during the administration of Metamizole may lead to falsely low results. Testing should be performed immediately prior to Metamizole dosing.    Non HDL Cholesterol 02/21/2024 191 (H)  0 - 149 mg/dL Final          Age       Desirable   Borderline High   High     Very High     0-19 Y     0 - 119       120 - 144     >/= 145    >/= 160    20-24 Y     0 - 149       150 - 189     >/= 190      ----         >24 Y    30 mg/dL above LDL Cholesterol goal      Glucose 02/21/2024 90  74 - 99 mg/dL Final    Sodium 02/21/2024 140  136 - 145 mmol/L Final    Potassium 02/21/2024 3.8  3.5 - 5.3 mmol/L Final    Chloride 02/21/2024 101  98 - 107 mmol/L Final    Bicarbonate 02/21/2024 31  21 - 32 mmol/L Final    Anion Gap 02/21/2024 12  10 - 20 mmol/L Final    Urea Nitrogen 02/21/2024 12  6 - 23 mg/dL Final    Creatinine 02/21/2024 0.89  0.50 - 1.30 mg/dL Final    eGFR 02/21/2024 >90  >60 mL/min/1.73m*2 Final    Calculations of estimated GFR are performed using the 2021 CKD-EPI Study Refit equation without the race variable for the IDMS-Traceable creatinine methods.  https://jasn.asnjournals.org/content/early/2021/09/22/ASN.0592658304    Calcium 02/21/2024 9.9  8.6 - 10.6 mg/dL Final    Albumin 02/21/2024 4.7  3.4 - 5.0 g/dL Final    Alkaline Phosphatase 02/21/2024 72  33 - 120 U/L Final    Total Protein 02/21/2024 7.7  6.4 - 8.2 g/dL Final    AST 02/21/2024 38  9 - 39 U/L Final    Bilirubin, Total 02/21/2024 0.7  0.0 - 1.2 mg/dL Final    ALT 02/21/2024 72 (H)  10 - 52 U/L Final    Patients treated with Sulfasalazine may generate falsely decreased results for ALT.    Thyroid Stimulating Hormone 02/21/2024 1.23  0.44 - 3.98 mIU/L Final    Vitamin D, 25-Hydroxy, Total 02/21/2024 38  30 - 100 ng/mL Final     No images are  attached to the encounter.        Assessment and Plan  Assessment/Plan   Problem List Items Addressed This Visit             ICD-10-CM    HTN (hypertension) - Primary I10     Chronic,-Unstable  Tried lisinopril in past-- stpped due to cough.    Managed on: Telmisartan, hydrochlorothiazide 25 mg daily added on 2/22/24   -start amlodipine 5mg (5/13/24)         Relevant Medications    amLODIPine (Norvasc) 5 mg tablet

## 2024-05-13 NOTE — ASSESSMENT & PLAN NOTE
Chronic,-Unstable  Tried lisinopril in past-- stpped due to cough.    Managed on: Telmisartan, hydrochlorothiazide 25 mg daily added on 2/22/24   -start amlodipine 5mg (5/13/24)

## 2024-07-01 DIAGNOSIS — I10 PRIMARY HYPERTENSION: Primary | ICD-10-CM

## 2024-07-01 RX ORDER — TELMISARTAN 80 MG/1
80 TABLET ORAL DAILY
Qty: 30 TABLET | Refills: 1 | Status: SHIPPED | OUTPATIENT
Start: 2024-07-01

## 2024-07-12 DIAGNOSIS — I10 PRIMARY HYPERTENSION: ICD-10-CM

## 2024-07-12 RX ORDER — AMLODIPINE BESYLATE 5 MG/1
5 TABLET ORAL DAILY
Qty: 30 TABLET | Refills: 0 | Status: SHIPPED | OUTPATIENT
Start: 2024-07-12

## 2024-08-03 DIAGNOSIS — I10 PRIMARY HYPERTENSION: ICD-10-CM

## 2024-08-05 RX ORDER — TELMISARTAN 80 MG/1
80 TABLET ORAL DAILY
Qty: 90 TABLET | Refills: 1 | Status: SHIPPED | OUTPATIENT
Start: 2024-08-05

## 2024-08-12 ENCOUNTER — APPOINTMENT (OUTPATIENT)
Dept: PRIMARY CARE | Facility: CLINIC | Age: 44
End: 2024-08-12
Payer: COMMERCIAL

## 2024-09-01 DIAGNOSIS — I10 PRIMARY HYPERTENSION: ICD-10-CM

## 2024-09-03 RX ORDER — AMLODIPINE BESYLATE 5 MG/1
5 TABLET ORAL DAILY
Qty: 90 TABLET | Refills: 1 | Status: SHIPPED | OUTPATIENT
Start: 2024-09-03 | End: 2025-03-02

## 2024-09-06 ENCOUNTER — APPOINTMENT (OUTPATIENT)
Dept: PRIMARY CARE | Facility: CLINIC | Age: 44
End: 2024-09-06
Payer: COMMERCIAL

## 2024-09-14 DIAGNOSIS — I10 HYPERTENSION, UNSPECIFIED TYPE: ICD-10-CM

## 2024-09-18 RX ORDER — HYDROCHLOROTHIAZIDE 25 MG/1
25 TABLET ORAL DAILY
Qty: 30 TABLET | Refills: 5 | Status: SHIPPED | OUTPATIENT
Start: 2024-09-18

## 2024-10-15 ENCOUNTER — APPOINTMENT (OUTPATIENT)
Dept: PRIMARY CARE | Facility: CLINIC | Age: 44
End: 2024-10-15
Payer: COMMERCIAL

## 2024-11-20 ENCOUNTER — APPOINTMENT (OUTPATIENT)
Dept: PRIMARY CARE | Facility: CLINIC | Age: 44
End: 2024-11-20
Payer: COMMERCIAL

## 2024-11-20 VITALS
SYSTOLIC BLOOD PRESSURE: 149 MMHG | WEIGHT: 270.3 LBS | BODY MASS INDEX: 42.43 KG/M2 | OXYGEN SATURATION: 98 % | DIASTOLIC BLOOD PRESSURE: 89 MMHG | TEMPERATURE: 97.1 F | HEIGHT: 67 IN | HEART RATE: 108 BPM

## 2024-11-20 DIAGNOSIS — G47.33 OBSTRUCTIVE SLEEP APNEA SYNDROME: ICD-10-CM

## 2024-11-20 DIAGNOSIS — I10 BENIGN ESSENTIAL HYPERTENSION: Primary | ICD-10-CM

## 2024-11-20 DIAGNOSIS — M62.838 MUSCLE SPASM: ICD-10-CM

## 2024-11-20 PROCEDURE — 99214 OFFICE O/P EST MOD 30 MIN: CPT | Performed by: INTERNAL MEDICINE

## 2024-11-20 PROCEDURE — 3077F SYST BP >= 140 MM HG: CPT | Performed by: INTERNAL MEDICINE

## 2024-11-20 PROCEDURE — 3008F BODY MASS INDEX DOCD: CPT | Performed by: INTERNAL MEDICINE

## 2024-11-20 PROCEDURE — 3079F DIAST BP 80-89 MM HG: CPT | Performed by: INTERNAL MEDICINE

## 2024-11-20 RX ORDER — LATANOPROST OPHTHALMIC SOLUTION 0.005% 50 UG/ML
SOLUTION/ DROPS TOPICAL DAILY
COMMUNITY

## 2024-11-20 RX ORDER — CYCLOBENZAPRINE HCL 10 MG
10 TABLET ORAL 2 TIMES DAILY PRN
Qty: 30 TABLET | Refills: 2 | Status: SHIPPED | OUTPATIENT
Start: 2024-11-20 | End: 2025-01-19

## 2024-11-20 ASSESSMENT — PATIENT HEALTH QUESTIONNAIRE - PHQ9
SUM OF ALL RESPONSES TO PHQ9 QUESTIONS 1 AND 2: 0
2. FEELING DOWN, DEPRESSED OR HOPELESS: NOT AT ALL
1. LITTLE INTEREST OR PLEASURE IN DOING THINGS: NOT AT ALL

## 2024-11-20 NOTE — PROGRESS NOTES
CHIEF COMPLAINT  [facility]    HISTORY OF PRESENT ILLNESS  Ole Maxwell is a 44 y.o. male presents today for follow up of [facility]    HPI    History reviewed and updated.  Generally doing well on current medications.  Exercises regularly and follows calorie controlled diet, usually 3688-2224 brandie/day.  Has trouble sustaining weight loss.    BP at home 124/82    He would like to get on CPAP - had sleep study in the last year, but order fell through.    Needs refill on Flexeril, uses PRN for back pain.     REVIEW OF SYSTEMS  Review of Systems   Constitutional:  Negative for chills and fever.   HENT:  Negative for congestion.    Respiratory:  Negative for cough, shortness of breath and wheezing.    Cardiovascular:  Negative for chest pain, palpitations and leg swelling.   Gastrointestinal:  Negative for abdominal distention, abdominal pain, constipation, diarrhea and vomiting.   Endocrine: Negative for heat intolerance.   Genitourinary:  Negative for dysuria, frequency, hematuria and urgency.   Musculoskeletal:  Negative for arthralgias and gait problem.   Skin:  Negative for rash.   Neurological:  Negative for numbness.   Psychiatric/Behavioral:  Negative for dysphoric mood. The patient is not nervous/anxious.        ALLERGIES  Patient has no known allergies.    MEDICATIONS  Current Outpatient Medications   Medication Instructions    amLODIPine (NORVASC) 5 mg, oral, Daily    cyclobenzaprine (FLEXERIL) 10 mg, oral, 2 times daily PRN    dorzolamide-timoloL (Cosopt) 22.3-6.8 mg/mL ophthalmic solution INSTILL 1 DROP INTO LEFT EYE TWICE A DAY    hydroCHLOROthiazide (HYDRODIURIL) 25 mg, oral, Daily    latanoprost, PF, (Iyuzeh, PF,) 0.005 % dropperette Daily    omeprazole (PRILOSEC) 40 mg, oral, Daily    telmisartan (MICARDIS) 80 mg, oral, Daily    triamcinolone (Kenalog) 0.1 % cream Topical, 2 times daily PRN       TOBACCO USE  Social History     Tobacco Use   Smoking Status Former    Current packs/day: 0.00     "Types: Cigarettes    Quit date: 2000    Years since quittin.9   Smokeless Tobacco Never       DEPRESSION SCREEN  Over the past 2 weeks, how often have you been bothered by any of the following problems?  Little interest or pleasure in doing things: Not at all  Feeling down, depressed, or hopeless: Not at all    SURGICAL HISTORY  Past Surgical History:  10/18/2018: CT GUIDED PERCUTANEOUS BIOPSY LUNG      Comment:  CT GUIDED PERCUTANEOUS BIOPSY LUNG 10/18/2018 [facility]                [facility]  2018: OTHER SURGICAL HISTORY      Comment:  Cataract surgery  2018: OTHER SURGICAL HISTORY      Comment:  Lung biopsy  2018: OTHER SURGICAL HISTORY      Comment:  History of prior surgery       OBJECTIVE    /89   Pulse 108   Temp 36.2 °C (97.1 °F)   Ht 1.702 m (5' 7\")   Wt 123 kg (270 lb 4.8 oz)   SpO2 98%   BMI 42.33 kg/m²    BMI: Estimated body mass index is 42.33 kg/m² as calculated from the following:    Height as of this encounter: 1.702 m (5' 7\").    Weight as of this encounter: 123 kg (270 lb 4.8 oz).    BP Readings from Last 3 Encounters:   24 149/89   24 (!) 172/99   24 158/86      Wt Readings from Last 3 Encounters:   24 123 kg (270 lb 4.8 oz)   24 123 kg (271 lb 3.2 oz)   24 123 kg (270 lb 12.8 oz)        PHYSICAL EXAM  Physical Exam  HENT:      Head: Normocephalic and atraumatic.   Cardiovascular:      Rate and Rhythm: Normal rate and regular rhythm.      Heart sounds: No murmur heard.  Pulmonary:      Effort: Pulmonary effort is normal. No respiratory distress.      Breath sounds: Normal breath sounds. No wheezing.   Abdominal:      General: There is no distension.      Palpations: Abdomen is soft.      Tenderness: There is no abdominal tenderness.   Musculoskeletal:      Right lower leg: No edema.      Left lower leg: No edema.   Neurological:      General: No focal deficit present.      Mental Status: He is alert and oriented to person, place, and " time. Mental status is at baseline.   Psychiatric:         Mood and Affect: Mood normal.          ASSESSMENT AND PLAN  Assessment/Plan   Problem List Items Addressed This Visit       Benign essential hypertension - Primary    Overview     - cough with lisinopril in the past         Sleep apnea    Overview     -Sleep study completed 12/12/2022-severe obstructive sleep apnea noted SpO2 zander was 68%           Relevant Orders    Positive Airway Pressure (PAP) Therapy     Other Visit Diagnoses       Muscle spasm        Relevant Medications    cyclobenzaprine (Flexeril) 10 mg tablet          Hypertension - controlled per home monitoring, continue current medication and healthy habits.    [facility] - sleep study 12/12/22, has not been able to get CPAP yet.  New orders completed.    Muscle spasms - Rx sent for Flexeril PRN.    Follow-up 6 months, will be due for fasting labs at that time.

## 2024-11-21 ASSESSMENT — ENCOUNTER SYMPTOMS
FEVER: 0
FREQUENCY: 0
NERVOUS/ANXIOUS: 0
COUGH: 0
DYSPHORIC MOOD: 0
PALPITATIONS: 0
CHILLS: 0
DYSURIA: 0
VOMITING: 0
DIARRHEA: 0
ABDOMINAL PAIN: 0
NUMBNESS: 0
HEMATURIA: 0
WHEEZING: 0
ARTHRALGIAS: 0
ABDOMINAL DISTENTION: 0
CONSTIPATION: 0
SHORTNESS OF BREATH: 0

## 2025-02-04 ENCOUNTER — TELEPHONE (OUTPATIENT)
Dept: PRIMARY CARE | Facility: CLINIC | Age: 45
End: 2025-02-04
Payer: COMMERCIAL

## 2025-02-04 DIAGNOSIS — K21.9 GASTROESOPHAGEAL REFLUX DISEASE, UNSPECIFIED WHETHER ESOPHAGITIS PRESENT: ICD-10-CM

## 2025-02-04 DIAGNOSIS — I10 PRIMARY HYPERTENSION: ICD-10-CM

## 2025-02-04 RX ORDER — TELMISARTAN 80 MG/1
80 TABLET ORAL DAILY
Qty: 30 TABLET | Refills: 11 | Status: SHIPPED | OUTPATIENT
Start: 2025-02-04

## 2025-02-04 RX ORDER — OMEPRAZOLE 40 MG/1
40 CAPSULE, DELAYED RELEASE ORAL DAILY
Qty: 30 CAPSULE | Refills: 11 | Status: SHIPPED | OUTPATIENT
Start: 2025-02-04

## 2025-02-28 DIAGNOSIS — I10 HYPERTENSION, UNSPECIFIED TYPE: ICD-10-CM

## 2025-02-28 RX ORDER — HYDROCHLOROTHIAZIDE 25 MG/1
25 TABLET ORAL DAILY
Qty: 30 TABLET | Refills: 5 | Status: SHIPPED | OUTPATIENT
Start: 2025-02-28

## 2025-03-07 DIAGNOSIS — I10 PRIMARY HYPERTENSION: ICD-10-CM

## 2025-03-07 RX ORDER — AMLODIPINE BESYLATE 5 MG/1
5 TABLET ORAL DAILY
Qty: 30 TABLET | Refills: 5 | Status: SHIPPED | OUTPATIENT
Start: 2025-03-07

## 2025-03-18 DIAGNOSIS — M62.838 MUSCLE SPASM: ICD-10-CM

## 2025-03-18 RX ORDER — CYCLOBENZAPRINE HCL 10 MG
10 TABLET ORAL 2 TIMES DAILY PRN
Qty: 30 TABLET | Refills: 2 | Status: SHIPPED | OUTPATIENT
Start: 2025-03-18

## 2025-03-31 ENCOUNTER — APPOINTMENT (OUTPATIENT)
Dept: DERMATOLOGY | Facility: CLINIC | Age: 45
End: 2025-03-31
Payer: COMMERCIAL

## 2025-05-21 ENCOUNTER — APPOINTMENT (OUTPATIENT)
Dept: PRIMARY CARE | Facility: CLINIC | Age: 45
End: 2025-05-21
Payer: COMMERCIAL

## 2025-09-01 DIAGNOSIS — I10 PRIMARY HYPERTENSION: ICD-10-CM

## 2025-09-01 DIAGNOSIS — I10 HYPERTENSION, UNSPECIFIED TYPE: ICD-10-CM

## 2025-09-01 RX ORDER — HYDROCHLOROTHIAZIDE 25 MG/1
25 TABLET ORAL DAILY
Qty: 90 TABLET | Refills: 3 | Status: SHIPPED | OUTPATIENT
Start: 2025-09-01

## 2025-09-01 RX ORDER — AMLODIPINE BESYLATE 5 MG/1
5 TABLET ORAL DAILY
Qty: 90 TABLET | Refills: 3 | Status: SHIPPED | OUTPATIENT
Start: 2025-09-01